# Patient Record
Sex: MALE | Race: WHITE | NOT HISPANIC OR LATINO | URBAN - METROPOLITAN AREA
[De-identification: names, ages, dates, MRNs, and addresses within clinical notes are randomized per-mention and may not be internally consistent; named-entity substitution may affect disease eponyms.]

---

## 2018-08-12 ENCOUNTER — EMERGENCY (EMERGENCY)
Facility: HOSPITAL | Age: 83
LOS: 1 days | Discharge: HOME | End: 2018-08-12
Attending: EMERGENCY MEDICINE
Payer: MEDICARE

## 2018-08-12 VITALS
WEIGHT: 160.06 LBS | OXYGEN SATURATION: 98 % | HEIGHT: 69 IN | RESPIRATION RATE: 16 BRPM | TEMPERATURE: 98 F | HEART RATE: 62 BPM | SYSTOLIC BLOOD PRESSURE: 162 MMHG | DIASTOLIC BLOOD PRESSURE: 84 MMHG

## 2018-08-12 PROCEDURE — 99283 EMERGENCY DEPT VISIT LOW MDM: CPT

## 2018-08-12 NOTE — ED PROVIDER NOTE - MEDICAL DECISION MAKING DETAILS
82 year old M Pt w/ bleeding from varicose veins, will apply pressure dressing and follow up with PMD

## 2018-08-12 NOTE — ED PROVIDER NOTE - CARE PLAN
Principal Discharge DX:	Varicose veins  Secondary Diagnosis:	Bleeding from varicose veins of lower extremity, left

## 2018-08-12 NOTE — ED PROVIDER NOTE - OBJECTIVE STATEMENT
82 year old M Pt w/ PMHx of varicose veins presents to ED c/o bleeding from varicose veins in left leg, popiteal region. Pt reports scratching leg when he noticed bleeding. Pt then called [id number]. LEONARD

## 2018-08-12 NOTE — ED ADULT NURSE NOTE - ADDITIONAL PRINTED INSTRUCTIONS GIVEN
Pt seen, treated and released in intake by MD, Pt aox3, no distress noted. No nursing intervention required.